# Patient Record
Sex: FEMALE | Race: WHITE | NOT HISPANIC OR LATINO | Employment: OTHER | URBAN - METROPOLITAN AREA
[De-identification: names, ages, dates, MRNs, and addresses within clinical notes are randomized per-mention and may not be internally consistent; named-entity substitution may affect disease eponyms.]

---

## 2017-09-22 RX ORDER — MULTIVIT WITH MINERALS/LUTEIN
250 TABLET ORAL DAILY
COMMUNITY
End: 2019-09-06

## 2017-09-22 RX ORDER — DICLOFENAC SODIUM 75 MG/1
75 TABLET, DELAYED RELEASE ORAL 2 TIMES DAILY
COMMUNITY
End: 2017-09-23 | Stop reason: ALTCHOICE

## 2017-09-22 RX ORDER — IBUPROFEN 200 MG
200 TABLET ORAL EVERY 6 HOURS PRN
COMMUNITY
End: 2018-01-24

## 2017-09-22 RX ORDER — ESTRADIOL 1 MG/1
1 TABLET ORAL DAILY
COMMUNITY
End: 2019-09-06

## 2017-09-22 RX ORDER — CHLORAL HYDRATE 500 MG
300 CAPSULE ORAL
COMMUNITY
End: 2019-09-06

## 2017-09-22 RX ORDER — TIZANIDINE 4 MG/1
4 TABLET ORAL NIGHTLY PRN
COMMUNITY

## 2017-09-23 ENCOUNTER — OFFICE VISIT (OUTPATIENT)
Dept: NEUROSURGERY | Facility: CLINIC | Age: 62
End: 2017-09-23

## 2017-09-23 VITALS
HEIGHT: 66 IN | TEMPERATURE: 98.2 F | OXYGEN SATURATION: 98 % | BODY MASS INDEX: 22.6 KG/M2 | SYSTOLIC BLOOD PRESSURE: 140 MMHG | HEART RATE: 75 BPM | DIASTOLIC BLOOD PRESSURE: 82 MMHG | WEIGHT: 140.6 LBS

## 2017-09-23 DIAGNOSIS — M25.512 LEFT SHOULDER PAIN, UNSPECIFIED CHRONICITY: ICD-10-CM

## 2017-09-23 DIAGNOSIS — M47.892 OTHER OSTEOARTHRITIS OF SPINE, CERVICAL REGION: ICD-10-CM

## 2017-09-23 DIAGNOSIS — M54.2 NECK PAIN: Primary | ICD-10-CM

## 2017-09-23 PROCEDURE — 99244 OFF/OP CNSLTJ NEW/EST MOD 40: CPT | Performed by: NEUROLOGICAL SURGERY

## 2017-09-23 RX ORDER — GABAPENTIN 100 MG/1
200 CAPSULE ORAL 3 TIMES DAILY
Qty: 90 CAPSULE | Refills: 3 | OUTPATIENT
Start: 2017-09-23 | End: 2019-09-06

## 2017-09-23 NOTE — PROGRESS NOTES
Patient: Rajani Fox  :  1955  Chart #:  [id number]    Date of Service: 17    Chief Complaint:   Chief Complaint   Patient presents with   • Neck Pain       Neck Pain    This is a new problem. Episode onset: April of this year. The problem occurs daily. The problem has been gradually worsening. The pain is associated with an unknown factor. Pain location: Between scapula, left deltoid region. The quality of the pain is described as aching. The pain is at a severity of 4/10. The pain is mild. The symptoms are aggravated by position (Sitting, turning head laterally). The pain is same all the time. Pertinent negatives include no chest pain, fever, headaches, numbness, photophobia, trouble swallowing or weakness. Associated symptoms comments: Left shoulder pain. She has tried muscle relaxants and NSAIDs (Physical therapy (dry needling)) for the symptoms. The treatment provided no relief.     She awoke with L thoracic parascapular pain the first of 2017.  She has developed intermittent pain in the L shoulder especially with sitting.  She has no neurologic complaints.  She has never had spine surgery.  She uses ibuprofen 800 mg po bid to tid.      Radiographic Images:   MRI C-spine w/o contrast from 17 demonstrates straightening of the cervical region, kyphosis is noted with apex at the C5 level, mild stenosis is evident at C6/C7, no cord compression or signal change is noted. Disc desiccation is evident throughout entire cervical spine with significant narrowing seen at the C5/C6 level.  No HNP or foraminal stenosis especially at the C4C5 level.      Past Medical History:   Diagnosis Date   • Atrophic vaginitis    • Hip pain    • Lentigo    • Menopausal syndrome    • Seborrheic keratosis      Current Outpatient Prescriptions   Medication Sig Dispense Refill   • estradiol (ESTRACE) 1 MG tablet Take 1 mg by mouth Daily.     • ibuprofen (ADVIL,MOTRIN) 200 MG tablet Take 200 mg by mouth Every 6  (Six) Hours As Needed for Mild Pain .     • Omega-3 Fatty Acids (FISH OIL) 1000 MG capsule capsule Take 300 mg by mouth Daily With Breakfast.     • tiZANidine (ZANAFLEX) 4 MG tablet Take 4 mg by mouth At Night As Needed for Muscle Spasms.     • tretinoin (RETIN-A) 0.025 % cream Apply 1 application topically Every Night.     • vitamin C (ASCORBIC ACID) 250 MG tablet Take 250 mg by mouth Daily.       No current facility-administered medications for this visit.       No Known Allergies  Social History     Social History   • Marital status:      Spouse name: N/A   • Number of children: N/A   • Years of education: N/A     Social History Main Topics   • Smoking status: Former Smoker     Types: Cigarettes     Quit date: 3/17/2001   • Smokeless tobacco: None   • Alcohol use Yes   • Drug use: No   • Sexual activity: Defer     Other Topics Concern   • None     Social History Narrative     Family History   Problem Relation Age of Onset   • Cancer Mother    • Cancer Father      Past Surgical History:   Procedure Laterality Date   • VEIN LIGATION AND STRIPPING  1980    [place], [place]     Review of Systems   Constitutional: Negative for activity change, appetite change, chills, diaphoresis, fatigue, fever and unexpected weight change.   HENT: Negative for congestion, dental problem, drooling, ear discharge, ear pain, facial swelling, hearing loss, mouth sores, nosebleeds, postnasal drip, rhinorrhea, sinus pressure, sneezing, sore throat, tinnitus, trouble swallowing and voice change.    Eyes: Negative for photophobia, pain, discharge, redness, itching and visual disturbance.   Respiratory: Negative for apnea, cough, choking, chest tightness, shortness of breath, wheezing and stridor.    Cardiovascular: Negative for chest pain, palpitations and leg swelling.   Gastrointestinal: Negative for abdominal distention, abdominal pain, anal bleeding, blood in stool, constipation, diarrhea, nausea, rectal pain and vomiting.   Endocrine:  "Negative for cold intolerance, heat intolerance, polydipsia, polyphagia and polyuria.   Genitourinary: Negative for decreased urine volume, difficulty urinating, dysuria, enuresis, flank pain, frequency, genital sores, hematuria and urgency.   Musculoskeletal: Positive for neck pain and neck stiffness. Negative for arthralgias, back pain, gait problem, joint swelling and myalgias.   Skin: Negative for color change, pallor, rash and wound.   Allergic/Immunologic: Negative for environmental allergies, food allergies and immunocompromised state.   Neurological: Negative for dizziness, tremors, seizures, syncope, facial asymmetry, speech difficulty, weakness, light-headedness, numbness and headaches.   Hematological: Negative for adenopathy. Does not bruise/bleed easily.   Psychiatric/Behavioral: Negative for agitation, behavioral problems, confusion, decreased concentration, dysphoric mood, hallucinations, self-injury, sleep disturbance and suicidal ideas. The patient is not nervous/anxious and is not hyperactive.    All other systems reviewed and are negative.    Vitals:    09/23/17 0850   BP: 140/82   BP Location: Right arm   Patient Position: Sitting   Cuff Size: Adult   Pulse: 75   Temp: 98.2 °F (36.8 °C)   TempSrc: Temporal Artery    SpO2: 98%   Weight: 140 lb 9.6 oz (63.8 kg)   Height: 66\" (167.6 cm)     Physical Exam  Neurologic Exam  Physical Exam   Constitutional: The patient is oriented to person, place, and time. Vital signs are normal. The patient appears well-developed and well-nourished and in no distress.   Neat  healthy female  HENT:   Head: Normocephalic.   Right Ear: Hearing normal.   Left Ear: Hearing normal.   Mouth/Throat: Uvula is midline, oropharynx is clear and moist and mucous membranes are normal.   Eyes: Conjunctivae, EOM and lids are normal. Pupils are equal, round, and reactive to light.   Fundoscopic exam:  The right eye shows no papilledema.   The left eye shows no papilledema.   Pupils 2 " mm; Fundi normal   Neck: Trachea normal and normal range of motion. No thyroid mass present.   Cardiovascular: Regular rhythm and normal heart sounds.   No murmur heard.  Pulses:  Carotid pulses are 2+ on the right side, and 2+ on the left side.  Radial pulses are 2+ on the right side, and 2+ on the left side.   Dorsalis pedis pulses are 2+ on the right side, and 2+ on the left side.   Pulmonary/Chest: Effort normal and breath sounds normal.   Musculoskeletal:   Lumbar back:  The patient exhibits normal range of motion, no deformity and no spasm.   No stiffness; SLR negative; Hip ROM normal        Neurologic Exam      Mental Status   Oriented to person, place, and time.   Attention: normal. Concentration: normal.   Speech: speech is normal   Level of consciousness: alert  Knowledge: good and consistent with education.   Normal comprehension.      Cranial Nerves   Cranial nerves II through XII intact.      CN III, IV, VI   Pupils are equal, round, and reactive to light.  Extraocular motions are normal.      Motor Exam   Muscle bulk: normal  Overall muscle tone: normal  No Pronator Drift    Strength   Strength 5/5 throughout.      Sensory Exam   Light touch normal.   Proprioception normal.      Gait, Coordination, and Reflexes      Gait  Gait: normal     Tremor   Resting tremor: absent  Intention tremor: absent  Action tremor: absent     Reflexes   Right biceps: 2+  Left biceps: 2+  Right triceps: 2+  Left triceps: 2+  Right patellar: 2+  Left patellar: 2+  Right achilles: 1+  Left achilles: 1+  No Babinski signs  Right Silver: absent  Left Silver: absent  Right ankle clonus: absent  Left ankle clonus: absent  MARIZOL normal; R handed      Rajani was seen today for neck pain.    Diagnoses and all orders for this visit:    Neck pain  -     gabapentin (NEURONTIN) 100 MG capsule; Take 2 capsules by mouth 3 (Three) Times a Day.    Other osteoarthritis of spine, cervical region    Left shoulder pain, unspecified  chronicity      Plan: Continue ibuprofen;  Apply ice to neck, upper back and L shoulder;  Prescribe gabapentin 200 mg po tid;  F/U in 2 months for re-evaluation.    I, Dr. Richard Najera, personally performed the services described in the documentation as scribed in my presence, and the documentation is both accurate and complete.    Chris Worthy, CMA

## 2017-12-18 ENCOUNTER — OFFICE VISIT (OUTPATIENT)
Dept: NEUROSURGERY | Facility: CLINIC | Age: 62
End: 2017-12-18

## 2017-12-18 VITALS
DIASTOLIC BLOOD PRESSURE: 82 MMHG | BODY MASS INDEX: 22.92 KG/M2 | HEART RATE: 75 BPM | HEIGHT: 66 IN | SYSTOLIC BLOOD PRESSURE: 120 MMHG | TEMPERATURE: 98.5 F | WEIGHT: 142.6 LBS | OXYGEN SATURATION: 97 %

## 2017-12-18 DIAGNOSIS — M47.892 OTHER OSTEOARTHRITIS OF SPINE, CERVICAL REGION: ICD-10-CM

## 2017-12-18 DIAGNOSIS — M25.512 LEFT SHOULDER PAIN, UNSPECIFIED CHRONICITY: ICD-10-CM

## 2017-12-18 DIAGNOSIS — M54.2 NECK PAIN: Primary | ICD-10-CM

## 2017-12-18 DIAGNOSIS — M79.18 MYOFASCIAL PAIN SYNDROME: ICD-10-CM

## 2017-12-18 PROCEDURE — 99213 OFFICE O/P EST LOW 20 MIN: CPT | Performed by: NEUROLOGICAL SURGERY

## 2017-12-18 RX ORDER — DOXEPIN HYDROCHLORIDE 10 MG/1
10 CAPSULE ORAL NIGHTLY
Qty: 30 CAPSULE | Refills: 3 | Status: SHIPPED | OUTPATIENT
Start: 2017-12-18 | End: 2019-09-06

## 2017-12-18 NOTE — PROGRESS NOTES
Patient: Rajani Fox  :  1955  Chart #:  [id number]    Date of Service: 17    Chief Complaint:   Chief Complaint   Patient presents with   • Neck Pain       Neck Pain    The current episode started more than 1 month ago. The problem occurs intermittently. The problem has been unchanged (She has no pain when the upper extremities are raised.). The pain is associated with an unknown factor. The pain is present in the occipital region (Most of her pain occurs when she is sitting.  She has some occipital pain, but not much.  She complains of left shoulder pain.). The quality of the pain is described as aching. The pain is at a severity of 3/10. The pain is mild (I have encouraged her to continue exercising daily.  ). The symptoms are aggravated by position. The pain is same all the time. Stiffness is present in the morning. Pertinent negatives include no chest pain, fever, headaches, numbness, photophobia, trouble swallowing or weakness. She has tried bed rest (I had started her on Gabapentin, however she does not feel that it was helpful.  She is to discontinue it at this point.) for the symptoms. The treatment provided mild relief.     Radiographic Images:    MRI C-spine w/o contrast from 17 demonstrates straightening of the cervical region, kyphosis is noted with apex at the C5 level, mild stenosis is evident at C6/C7, no cord compression or signal change is noted. Disc desiccation is evident throughout entire cervical spine with significant narrowing seen at the C5/C6 level.  No HNP or foraminal stenosis especially at the C4C5 level.      Past Medical History:   Diagnosis Date   • Atrophic vaginitis    • Hip pain    • Lentigo    • Menopausal syndrome    • Seborrheic keratosis      Current Outpatient Prescriptions   Medication Sig Dispense Refill   • estradiol (ESTRACE) 1 MG tablet Take 1 mg by mouth Daily.     • gabapentin (NEURONTIN) 100 MG capsule Take 2 capsules by mouth 3 (Three) Times a  Day. 90 capsule 3   • ibuprofen (ADVIL,MOTRIN) 200 MG tablet Take 200 mg by mouth Every 6 (Six) Hours As Needed for Mild Pain .     • Omega-3 Fatty Acids (FISH OIL) 1000 MG capsule capsule Take 300 mg by mouth Daily With Breakfast.     • tiZANidine (ZANAFLEX) 4 MG tablet Take 4 mg by mouth At Night As Needed for Muscle Spasms.     • tretinoin (RETIN-A) 0.025 % cream Apply 1 application topically Every Night.     • vitamin C (ASCORBIC ACID) 250 MG tablet Take 250 mg by mouth Daily.     • doxepin (SINEquan) 10 MG capsule Take 1 capsule by mouth Every Night. 30 capsule 3     No current facility-administered medications for this visit.       No Known Allergies  Social History     Social History   • Marital status:      Spouse name: N/A   • Number of children: N/A   • Years of education: N/A     Social History Main Topics   • Smoking status: Former Smoker     Types: Cigarettes     Quit date: 3/17/2001   • Smokeless tobacco: Never Used   • Alcohol use Yes   • Drug use: No   • Sexual activity: Defer     Other Topics Concern   • None     Social History Narrative     Family History   Problem Relation Age of Onset   • Cancer Mother    • Cancer Father      Past Surgical History:   Procedure Laterality Date   • VEIN LIGATION AND STRIPPING  1980    [place], [place]     Review of Systems   Constitutional: Negative for activity change, appetite change, chills, diaphoresis, fatigue, fever and unexpected weight change.   HENT: Negative for congestion, dental problem, drooling, ear discharge, ear pain, facial swelling, hearing loss, mouth sores, nosebleeds, postnasal drip, rhinorrhea, sinus pressure, sneezing, sore throat, tinnitus, trouble swallowing and voice change.    Eyes: Negative for photophobia, pain, discharge, redness, itching and visual disturbance.   Respiratory: Negative for apnea, cough, choking, chest tightness, shortness of breath, wheezing and stridor.    Cardiovascular: Negative for chest pain, palpitations and leg  "swelling.   Gastrointestinal: Negative for abdominal distention, abdominal pain, anal bleeding, blood in stool, constipation, diarrhea, nausea, rectal pain and vomiting.   Endocrine: Negative for cold intolerance, heat intolerance, polydipsia, polyphagia and polyuria.   Genitourinary: Negative for decreased urine volume, difficulty urinating, dysuria, enuresis, flank pain, frequency, genital sores, hematuria and urgency.   Musculoskeletal: Positive for neck pain and neck stiffness. Negative for arthralgias, back pain, gait problem, joint swelling and myalgias.   Skin: Negative for color change, pallor, rash and wound.   Allergic/Immunologic: Negative for environmental allergies, food allergies and immunocompromised state.   Neurological: Negative for dizziness, tremors, seizures, syncope, facial asymmetry, speech difficulty, weakness, light-headedness, numbness and headaches.   Hematological: Negative for adenopathy. Does not bruise/bleed easily.   Psychiatric/Behavioral: Negative for agitation, behavioral problems, confusion, decreased concentration, dysphoric mood, hallucinations, self-injury, sleep disturbance and suicidal ideas. The patient is not nervous/anxious and is not hyperactive.    All other systems reviewed and are negative.    Vitals:    12/18/17 1041   BP: 120/82   BP Location: Right arm   Patient Position: Sitting   Pulse: 75   Temp: 98.5 °F (36.9 °C)   TempSrc: Temporal Artery    SpO2: 97%   Weight: 64.7 kg (142 lb 9.6 oz)   Height: 167.6 cm (65.98\")     Physical Exam  Neurologic Exam  Constitutional: The patient is oriented to person, place, and time.  The patient appears well-developed and well-nourished and in no distress.   Neat  healthy female  Neck: Trachea normal and normal range of motion. No thyroid mass present.   Shoulder ROM normal and painless.  Some tenderness of R upper thoracic paraspinal muscles and parascapular muscles.  Cardiovascular: Regular rhythm and normal heart sounds.   No " murmur heard.  Pulses:  Carotid pulses are 2+ on the right side, and 2+ on the left side.  Radial pulses are 2+ on the right side, and 2+ on the left side.   Dorsalis pedis pulses are 2+ on the right side, and 2+ on the left side.   Pulmonary/Chest: Effort normal and breath sounds normal.   Musculoskeletal:   Lumbar back:  The patient exhibits normal range of motion, no deformity and no spasm.   No stiffness; SLR negative; Hip ROM normal         Neurologic Exam       Mental Status   Oriented to person, place, and time.   Attention: normal. Concentration: normal.   Speech: speech is normal   Level of consciousness: alert  Knowledge: good and consistent with education.   Normal comprehension.       Cranial Nerves   Cranial nerves II through XII intact.       Motor Exam   Muscle bulk: normal  Overall muscle tone: normal  No Pronator Drift     Strength   Strength 5/5 throughout.       Sensory Exam   Light touch normal.   Proprioception normal.       Gait, Coordination, and Reflexes       Gait: normal      Tremor   Resting tremor: absent  Intention tremor: absent  Action tremor: absent      Reflexes   Right biceps: 2+  Left biceps: 2+  Right triceps: 2+  Left triceps: 2+  Right patellar: 2+  Left patellar: 2+  Right achilles: 1+  Left achilles: 1+  No Babinski signs  Right Silver: absent  Left Silver: absent  Right ankle clonus: absent  Left ankle clonus: absent  MARIZOL normal; R handed       Rajani was seen today for neck pain.    Diagnoses and all orders for this visit:    Neck pain  -     doxepin (SINEquan) 10 MG capsule; Take 1 capsule by mouth Every Night.    Other osteoarthritis of spine, cervical region  -     doxepin (SINEquan) 10 MG capsule; Take 1 capsule by mouth Every Night.    Left shoulder pain, unspecified chronicity  -     doxepin (SINEquan) 10 MG capsule; Take 1 capsule by mouth Every Night.    Myofascial pain syndrome      Plan: Stop gabapentin;  Prescribe doxepin at hs;  Use ibuprofen prn;  Return for  re-evaluation in 1 month.  I don't see an indication for spinal surgery at this time.    I, Dr. Richard Najera, personally performed the services described in the documentation as scribed in my presence, and the documentation is both accurate and complete.    Richard Najera MD

## 2018-01-24 ENCOUNTER — OFFICE VISIT (OUTPATIENT)
Dept: NEUROSURGERY | Facility: CLINIC | Age: 63
End: 2018-01-24

## 2018-01-24 VITALS
WEIGHT: 147 LBS | HEIGHT: 66 IN | HEART RATE: 70 BPM | OXYGEN SATURATION: 98 % | DIASTOLIC BLOOD PRESSURE: 80 MMHG | BODY MASS INDEX: 23.63 KG/M2 | TEMPERATURE: 97.7 F | SYSTOLIC BLOOD PRESSURE: 130 MMHG

## 2018-01-24 DIAGNOSIS — M79.18 MYOFASCIAL PAIN SYNDROME: ICD-10-CM

## 2018-01-24 DIAGNOSIS — M54.2 NECK PAIN: Primary | ICD-10-CM

## 2018-01-24 DIAGNOSIS — M25.512 LEFT SHOULDER PAIN, UNSPECIFIED CHRONICITY: ICD-10-CM

## 2018-01-24 DIAGNOSIS — M47.892 OTHER OSTEOARTHRITIS OF SPINE, CERVICAL REGION: ICD-10-CM

## 2018-01-24 PROCEDURE — 99213 OFFICE O/P EST LOW 20 MIN: CPT | Performed by: NEUROLOGICAL SURGERY

## 2018-01-24 RX ORDER — MELOXICAM 15 MG/1
15 TABLET ORAL DAILY
Qty: 30 TABLET | Refills: 3 | Status: SHIPPED | OUTPATIENT
Start: 2018-01-24 | End: 2019-09-06

## 2018-01-24 NOTE — PROGRESS NOTES
Patient: Rajani Fox  :  1955  Chart #:  [id number]    Date of Service: 18    Chief Complaint:   Chief Complaint   Patient presents with   • Neck Pain       Neck Pain    Chronicity: Patient returns today for a follow-up on her neck and shoulder pain. Episode onset: Most of her pain is left sided.  When she turns her head to the left her pain increases. The problem occurs intermittently. The problem has been unchanged (She denies pain when her left upper extremity is raised.). The pain is associated with an unknown (turning head to left) factor. The pain is present in the left side (pain is in left neck radiating to left scapula and shoulder.). The quality of the pain is described as aching. The pain is at a severity of 4/10. The pain is mild. The symptoms are aggravated by position (She feels that spending too much time on the phone and computer use has contributed to her discomfort.). The pain is same all the time. Stiffness is present in the morning. Pertinent negatives include no numbness or weakness. Associated symptoms comments: No radicular or myelopathic symptoms.. She has tried bed rest and NSAIDs (She hasn't noticed much relief with Doxepin.  She takes 800 mg of Ibuprofen three times daily.  I am going to have her discontinue IBU at this point so she can try Meloxicam.) for the symptoms. The treatment provided mild (I have encourgaed her to maintain her range of motion.) relief.       Radiographic Images:    MRI C-spine w/o contrast from 17 demonstrates straightening of the cervical region, kyphosis is noted with apex at the C5 level, mild stenosis is evident at C6/C7, no cord compression or signal change is noted. Disc desiccation is evident throughout entire cervical spine with significant narrowing seen at the C5/C6 level.  No HNP or foraminal stenosis especially at the C4C5 level.     Past Medical History:   Diagnosis Date   • Atrophic vaginitis    • Hip pain    • Lentigo    •  "Menopausal syndrome    • Seborrheic keratosis      Current Outpatient Prescriptions   Medication Sig Dispense Refill   • doxepin (SINEquan) 10 MG capsule Take 1 capsule by mouth Every Night. 30 capsule 3   • estradiol (ESTRACE) 1 MG tablet Take 1 mg by mouth Daily.     • gabapentin (NEURONTIN) 100 MG capsule Take 2 capsules by mouth 3 (Three) Times a Day. 90 capsule 3   • Omega-3 Fatty Acids (FISH OIL) 1000 MG capsule capsule Take 300 mg by mouth Daily With Breakfast.     • tiZANidine (ZANAFLEX) 4 MG tablet Take 4 mg by mouth At Night As Needed for Muscle Spasms.     • tretinoin (RETIN-A) 0.025 % cream Apply 1 application topically Every Night.     • vitamin C (ASCORBIC ACID) 250 MG tablet Take 250 mg by mouth Daily.     • meloxicam (MOBIC) 15 MG tablet Take 1 tablet by mouth Daily. With food 30 tablet 3     No current facility-administered medications for this visit.       No Known Allergies  Social History     Social History   • Marital status:      Spouse name: N/A   • Number of children: N/A   • Years of education: N/A     Social History Main Topics   • Smoking status: Former Smoker     Types: Cigarettes     Quit date: 3/17/2001   • Smokeless tobacco: Never Used   • Alcohol use Yes   • Drug use: No   • Sexual activity: Defer     Other Topics Concern   • None     Social History Narrative     Family History   Problem Relation Age of Onset   • Cancer Mother    • Cancer Father      Past Surgical History:   Procedure Laterality Date   • VEIN LIGATION AND STRIPPING  1980    [place], [place]     Review of Systems   Musculoskeletal: Positive for myalgias, neck pain and neck stiffness.   Neurological: Negative for weakness and numbness.   All other systems reviewed and are negative.    Vitals:    01/24/18 0842   BP: 130/80   BP Location: Right arm   Patient Position: Sitting   Pulse: 70   Temp: 97.7 °F (36.5 °C)   TempSrc: Temporal Artery    SpO2: 98%   Weight: 66.7 kg (147 lb)   Height: 167.6 cm (65.98\")     Physical " Exam  Neurologic Exam  Constitutional: The patient is oriented to person, place, and time.  The patient appears well-developed and well-nourished and in no distress.   Neat  healthy female  Neck: Trachea normal and normal range of motion. No thyroid mass present.   Shoulder ROM normal and painless.  Some tenderness of R upper thoracic paraspinal muscles and parascapular muscles.  Cardiovascular: Regular rhythm   No murmur heard.  Pulses:  Carotid pulses are 2+ on the right side, and 2+ on the left side.  Radial pulses are 2+ on the right side, and 2+ on the left side.   Dorsalis pedis pulses are 2+ on the right side, and 2+ on the left side.   Pulmonary/Chest: Effort normal   Musculoskeletal:   Lumbar back:  The patient exhibits normal range of motion, no deformity and no spasm.   No stiffness; SLR negative; Hip ROM normal         Neurologic Exam       Mental Status   Oriented to person, place, and time.   Attention: normal. Concentration: normal.   Speech: speech is normal   Level of consciousness: alert  Knowledge: good and consistent with education.   Normal comprehension.       Cranial Nerves   Cranial nerves II through XII intact.       Motor Exam   Muscle bulk: normal  Overall muscle tone: normal  No Pronator Drift      Strength   Strength 5/5 throughout.       Sensory Exam   Light touch normal.   Proprioception normal.       Gait, Coordination, and Reflexes       Gait: normal      Tremor   Resting tremor: absent  Intention tremor: absent  Action tremor: absent      Reflexes   Right biceps: 2+  Left biceps: 2+  Right triceps: 2+  Left triceps: 2+  Right patellar: 2+  Left patellar: 2+  Right achilles: 1+  Left achilles: 1+  No Babinski signs  Right Silver: absent  Left Silver: absent  Right ankle clonus: absent  Left ankle clonus: absent  MARIZOL normal; R handed       Rajani was seen today for neck pain.    Diagnoses and all orders for this visit:    Neck pain  -     meloxicam (MOBIC) 15 MG tablet; Take 1  tablet by mouth Daily. With food    Other osteoarthritis of spine, cervical region  -     meloxicam (MOBIC) 15 MG tablet; Take 1 tablet by mouth Daily. With food    Left shoulder pain, unspecified chronicity  -     meloxicam (MOBIC) 15 MG tablet; Take 1 tablet by mouth Daily. With food    Myofascial pain syndrome  -     meloxicam (MOBIC) 15 MG tablet; Take 1 tablet by mouth Daily. With food      Plan:  She is to stop the ibuprofen;  Prescribe meloxicam 15 mg po daily with food;  Use ice alternating with heat application to neck.  Continue regular exercise.  Return for re-evaluation in 6 weeks.      I, Dr. Richard Najera, personally performed the services described in the documentation as scribed in my presence, and the documentation is both accurate and complete.    Richard Najera MD

## 2019-09-06 ENCOUNTER — OFFICE VISIT (OUTPATIENT)
Dept: NEUROSURGERY | Facility: CLINIC | Age: 64
End: 2019-09-06

## 2019-09-06 VITALS
BODY MASS INDEX: 22.14 KG/M2 | DIASTOLIC BLOOD PRESSURE: 80 MMHG | TEMPERATURE: 97.6 F | WEIGHT: 137.8 LBS | SYSTOLIC BLOOD PRESSURE: 142 MMHG | HEIGHT: 66 IN

## 2019-09-06 DIAGNOSIS — G89.29 CHRONIC RIGHT-SIDED LOW BACK PAIN WITH BILATERAL SCIATICA: Primary | ICD-10-CM

## 2019-09-06 DIAGNOSIS — M54.41 CHRONIC RIGHT-SIDED LOW BACK PAIN WITH BILATERAL SCIATICA: Primary | ICD-10-CM

## 2019-09-06 DIAGNOSIS — M54.42 CHRONIC RIGHT-SIDED LOW BACK PAIN WITH BILATERAL SCIATICA: Primary | ICD-10-CM

## 2019-09-06 DIAGNOSIS — R20.0 BILATERAL LEG NUMBNESS: ICD-10-CM

## 2019-09-06 PROCEDURE — 99243 OFF/OP CNSLTJ NEW/EST LOW 30: CPT | Performed by: NEUROLOGICAL SURGERY

## 2019-09-06 RX ORDER — NIFEDIPINE 30 MG/1
TABLET, EXTENDED RELEASE ORAL
COMMUNITY
Start: 2019-07-01

## 2019-09-06 NOTE — PROGRESS NOTES
Subjective     Chief Complaint: Intermittent low back pain, bilateral leg numbness    Patient ID: Rajani Fox is a 63 y.o. female is here today for follow-up.    Back Pain   The pain is at a severity of 0/10. The patient is experiencing no pain. Associated symptoms include leg pain, numbness and weakness. Pertinent negatives include no abdominal pain, chest pain, dysuria, fever or headaches.   Leg Pain    Associated symptoms include numbness.       This is a 63-year-old woman who was previously seen Dr. Najera for neck pain.  She presents to my clinic with chief complaints of bilateral leg numbness.  She had an episode of some low back pain and right hip/buttock pain which responded to physical therapy.  About the time she started her physical therapy, she began to notice some numbness in her legs.  This started in April and has not improved significantly.  Her back pain and right hip pain have abated.    The following portions of the patient's history were reviewed and updated as appropriate: allergies, current medications, past family history, past medical history, past social history, past surgical history and problem list.    Family history:   Family History   Problem Relation Age of Onset   • Cancer Mother    • Cancer Father        Social history:   Social History     Socioeconomic History   • Marital status:      Spouse name: Not on file   • Number of children: Not on file   • Years of education: Not on file   • Highest education level: Not on file   Tobacco Use   • Smoking status: Former Smoker     Types: Cigarettes     Last attempt to quit: 3/17/2001     Years since quittin.4   • Smokeless tobacco: Never Used   Substance and Sexual Activity   • Alcohol use: Yes   • Drug use: No   • Sexual activity: Defer       Review of Systems   Constitutional: Negative for activity change, appetite change, chills, diaphoresis, fatigue, fever and unexpected weight change.   HENT: Negative for congestion,  "dental problem, drooling, ear discharge, ear pain, facial swelling, hearing loss, mouth sores, nosebleeds, postnasal drip, rhinorrhea, sinus pressure, sneezing, sore throat, tinnitus, trouble swallowing and voice change.    Eyes: Negative for photophobia, pain, discharge, redness, itching and visual disturbance.   Respiratory: Negative for apnea, cough, choking, chest tightness, shortness of breath, wheezing and stridor.    Cardiovascular: Negative for chest pain, palpitations and leg swelling.   Gastrointestinal: Negative for abdominal distention, abdominal pain, anal bleeding, blood in stool, constipation, diarrhea, nausea, rectal pain and vomiting.   Endocrine: Negative for cold intolerance, heat intolerance, polydipsia, polyphagia and polyuria.   Genitourinary: Negative for decreased urine volume, difficulty urinating, dysuria, enuresis, flank pain, frequency, genital sores, hematuria and urgency.   Musculoskeletal: Negative for arthralgias, back pain, gait problem, joint swelling, myalgias, neck pain and neck stiffness.   Skin: Negative for color change, pallor, rash and wound.   Allergic/Immunologic: Negative for environmental allergies, food allergies and immunocompromised state.   Neurological: Positive for weakness and numbness. Negative for dizziness, tremors, seizures, syncope, facial asymmetry, speech difficulty, light-headedness and headaches.   Hematological: Negative for adenopathy. Does not bruise/bleed easily.   Psychiatric/Behavioral: Negative for agitation, behavioral problems, confusion, decreased concentration, dysphoric mood, hallucinations, self-injury, sleep disturbance and suicidal ideas. The patient is not nervous/anxious and is not hyperactive.        Objective   Blood pressure 142/80, temperature 97.6 °F (36.4 °C), temperature source Temporal, height 167.6 cm (65.98\"), weight 62.5 kg (137 lb 12.8 oz).  Body mass index is 22.25 kg/m².    Physical Exam   Constitutional: She is oriented to " person, place, and time. She appears well-developed.  Non-toxic appearance.   HENT:   Head: Normocephalic and atraumatic.   Right Ear: Hearing normal.   Left Ear: Hearing normal.   Nose: Nose normal.   Eyes: Conjunctivae, EOM and lids are normal. Pupils are equal, round, and reactive to light.   Neck: Normal range of motion. No JVD present.   Cardiovascular: Normal rate and regular rhythm.   Pulses:       Radial pulses are 2+ on the right side, and 2+ on the left side.   Pulmonary/Chest: Effort normal. No stridor. No respiratory distress. She has no wheezes.   Neurological: She is alert and oriented to person, place, and time. She displays normal reflexes. No cranial nerve deficit. She exhibits normal muscle tone. She displays a negative Romberg sign. Coordination and gait normal. GCS eye subscore is 4. GCS verbal subscore is 5. GCS motor subscore is 6.   Reflex Scores:       Tricep reflexes are 2+ on the right side and 2+ on the left side.       Bicep reflexes are 2+ on the right side and 2+ on the left side.       Brachioradialis reflexes are 2+ on the right side and 2+ on the left side.       Patellar reflexes are 2+ on the right side and 2+ on the left side.       Achilles reflexes are 0 on the right side and 0 on the left side.  Skin: Skin is warm and dry. No rash noted. No erythema.   Psychiatric: She has a normal mood and affect. Her behavior is normal. Judgment and thought content normal.   Nursing note and vitals reviewed.        Assessment/Plan     Independent Review of Radiographic Studies:      Available for my review is a MRI of the lumbar spine which was performed on 8/26/2019.  This demonstrates a grade 1 spondylolisthesis of L4 on L5.  There appears to be a facet cyst occupying the left lateral recess/neural foramen at L4-5 on the left side potentially contributing to a left-sided L4 radiculopathy.  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  There are no other abnormal foci of neural  element impingement.  Lumbar lordosis is largely preserved.  There is some widening of the facet complex at L4-5.  There are no Modic endplate changes.    Medical Decision Making:      This is a 63-year-old woman with subjectively decreased sensation in her legs circumferentially below the knees as well as absent ankle jerk reflexes.  She does not have any other neurological deficits that I am able to uncover.  I have ordered a EMG/nerve conduction test of her bilateral lower extremities.  I have advised her to resume her normal exercise routine.  If she exhibits worsening back pain or sciatica with resumption of normal activities, then I will order flexion-extension films of her lumbar spine to assess for dynamic instability.  I will follow-up with her after her EMG has been completed.    Rajani was seen today for back pain and leg pain.    Diagnoses and all orders for this visit:    Chronic right-sided low back pain with bilateral sciatica  -     EMG & Nerve Conduction Test; Future        No Follow-up on file.           This document signed by GUERA Byrne MD September 6, 2019 10:51 AM

## 2019-09-20 ENCOUNTER — OFFICE VISIT (OUTPATIENT)
Dept: NEUROSURGERY | Facility: CLINIC | Age: 64
End: 2019-09-20

## 2019-09-20 VITALS
DIASTOLIC BLOOD PRESSURE: 80 MMHG | TEMPERATURE: 97.2 F | BODY MASS INDEX: 21.73 KG/M2 | HEIGHT: 66 IN | SYSTOLIC BLOOD PRESSURE: 130 MMHG | WEIGHT: 135.2 LBS

## 2019-09-20 DIAGNOSIS — M54.17 LUMBOSACRAL RADICULOPATHY AT L5: Primary | ICD-10-CM

## 2019-09-20 PROCEDURE — 99243 OFF/OP CNSLTJ NEW/EST LOW 30: CPT | Performed by: NEUROLOGICAL SURGERY

## 2019-09-20 NOTE — PROGRESS NOTES
Subjective     Chief Complaint: Lower extremity numbness    Patient ID: Rajani Fox is a 63 y.o. female is here today for follow-up.    History of Present Illness    This is a 63-year-old woman who I saw in consultation 2 weeks ago for some lower extremity numbness.  It sounds like she probably had an episode of L5 radiculopathy.  Her pain and weakness have subsided, but she is still having some numbness in the L5 distribution.  She denies back or leg pain.  She presents today to discuss the results of an EMG/nerve conduction test that I ordered    The following portions of the patient's history were reviewed and updated as appropriate: allergies, current medications, past family history, past medical history, past social history, past surgical history and problem list.    Family history:   Family History   Problem Relation Age of Onset   • Cancer Mother    • Cancer Father        Social history:   Social History     Socioeconomic History   • Marital status:      Spouse name: Not on file   • Number of children: Not on file   • Years of education: Not on file   • Highest education level: Not on file   Tobacco Use   • Smoking status: Former Smoker     Types: Cigarettes     Last attempt to quit: 3/17/2001     Years since quittin.5   • Smokeless tobacco: Never Used   Substance and Sexual Activity   • Alcohol use: Yes   • Drug use: No   • Sexual activity: Defer       Review of Systems   Constitutional: Negative for activity change, appetite change, chills, diaphoresis, fatigue, fever and unexpected weight change.   HENT: Negative for congestion, dental problem, drooling, ear discharge, ear pain, facial swelling, hearing loss, mouth sores, nosebleeds, postnasal drip, rhinorrhea, sinus pressure, sneezing, sore throat, tinnitus, trouble swallowing and voice change.    Eyes: Negative for photophobia, pain, discharge, redness, itching and visual disturbance.   Respiratory: Negative for apnea, cough, choking,  "chest tightness, shortness of breath, wheezing and stridor.    Cardiovascular: Negative for chest pain, palpitations and leg swelling.   Gastrointestinal: Negative for abdominal distention, abdominal pain, anal bleeding, blood in stool, constipation, diarrhea, nausea, rectal pain and vomiting.   Endocrine: Negative for cold intolerance, heat intolerance, polydipsia, polyphagia and polyuria.   Genitourinary: Negative for decreased urine volume, difficulty urinating, dysuria, enuresis, flank pain, frequency, genital sores, hematuria and urgency.   Musculoskeletal: Negative for arthralgias, back pain, gait problem, joint swelling, myalgias, neck pain and neck stiffness.   Skin: Negative for color change, pallor, rash and wound.   Allergic/Immunologic: Negative for environmental allergies, food allergies and immunocompromised state.   Neurological: Positive for weakness and numbness. Negative for dizziness, tremors, seizures, syncope, facial asymmetry, speech difficulty, light-headedness and headaches.   Hematological: Negative for adenopathy. Does not bruise/bleed easily.   Psychiatric/Behavioral: Negative for agitation, behavioral problems, confusion, decreased concentration, dysphoric mood, hallucinations, self-injury, sleep disturbance and suicidal ideas. The patient is not nervous/anxious and is not hyperactive.        Objective   Blood pressure 130/80, temperature 97.2 °F (36.2 °C), temperature source Temporal, height 167.6 cm (65.98\"), weight 61.3 kg (135 lb 3.2 oz).  Body mass index is 21.83 kg/m².    Physical Exam   Constitutional: She is oriented to person, place, and time. She appears well-developed and well-nourished. No distress.   HENT:   Head: Normocephalic and atraumatic.   Pulmonary/Chest: Effort normal.   Neurological: She is alert and oriented to person, place, and time. No cranial nerve deficit.   Skin: Skin is warm and dry. She is not diaphoretic.   Psychiatric: She has a normal mood and affect. "         Assessment/Plan     Independent Review of Radiographic Studies:      Her EMG/nerve conduction test is unremarkable, with the exception of some chronic L4/5 radiculopathy.    Medical Decision Making:      This is a 63-year-old woman with resolving L5 radiculopathy.  She still has some numbness but she does not have any back or leg pain.  My recommendation is for physical therapy.  If she has worsening symptoms, specifically low back pain or pain in the L5 distribution, she might be a candidate for pain management or potentially even surgery.  I would like to follow-up with her in about 3 months, or sooner if clinically indicated.    There are no diagnoses linked to this encounter.    Return in about 3 months (around 12/20/2019).           This document signed by GUERA Byrne MD September 20, 2019 2:06 PM

## 2021-03-01 ENCOUNTER — IMMUNIZATION (OUTPATIENT)
Dept: VACCINE CLINIC | Facility: HOSPITAL | Age: 66
End: 2021-03-01

## 2021-03-01 PROCEDURE — 0001A: CPT | Performed by: INTERNAL MEDICINE

## 2021-03-01 PROCEDURE — 91300 HC SARSCOV02 VAC 30MCG/0.3ML IM: CPT | Performed by: INTERNAL MEDICINE

## 2021-03-22 ENCOUNTER — IMMUNIZATION (OUTPATIENT)
Dept: VACCINE CLINIC | Facility: HOSPITAL | Age: 66
End: 2021-03-22

## 2021-03-22 PROCEDURE — 91300 HC SARSCOV02 VAC 30MCG/0.3ML IM: CPT | Performed by: INTERNAL MEDICINE

## 2021-03-22 PROCEDURE — 0002A: CPT | Performed by: INTERNAL MEDICINE
